# Patient Record
Sex: FEMALE | Race: WHITE | NOT HISPANIC OR LATINO | URBAN - METROPOLITAN AREA
[De-identification: names, ages, dates, MRNs, and addresses within clinical notes are randomized per-mention and may not be internally consistent; named-entity substitution may affect disease eponyms.]

---

## 2021-05-25 ENCOUNTER — EVALUATION (OUTPATIENT)
Dept: PHYSICAL THERAPY | Facility: CLINIC | Age: 61
End: 2021-05-25
Payer: COMMERCIAL

## 2021-05-25 DIAGNOSIS — G89.29 CHRONIC RIGHT HIP PAIN: ICD-10-CM

## 2021-05-25 DIAGNOSIS — M76.31 IT BAND SYNDROME, RIGHT: Primary | ICD-10-CM

## 2021-05-25 DIAGNOSIS — M25.551 CHRONIC RIGHT HIP PAIN: ICD-10-CM

## 2021-05-25 PROCEDURE — 97161 PT EVAL LOW COMPLEX 20 MIN: CPT

## 2021-05-25 NOTE — LETTER
May 26, 2021    MD [address]  [address]  [address] [place] [address] Dr    Patient: Felicity Small   YOB: 1960   Date of Visit: 2021     Encounter Diagnosis     ICD-10-CM    1  It band syndrome, right  M76 31    2  Chronic right hip pain  M25 551     G89 29        Dear Dr Shiloh Ragsdale: Thank you for your recent referral of Felicity Small  Please review the attached evaluation summary from Annel's recent visit  Please verify that you agree with the plan of care by signing the attached order  If you have any questions or concerns, please do not hesitate to call  I sincerely appreciate the opportunity to share in the care of one of your patients and hope to have another opportunity to work with you in the near future  Sincerely,    Niki Villagran, PT      Referring Provider:      I certify that I have read the below Plan of Care and certify the need for these services furnished under this plan of treatment while under my care  MD [address]  [address]  [address] [place] [address] Dr  Via Fax: [phone]          PT Evaluation     Today's date: 2021  Patient name: Felicity Small  : 1960  MRN: [id number]  Referring provider: Suze Rodriguez MD  Dx:   Encounter Diagnosis     ICD-10-CM    1  It band syndrome, right  M76 31    2  Chronic right hip pain  M25 551     G89 29                   Assessment  Assessment details: Pt is a 64 y o  female presenting to PT with chronic R hip and IT band pain  PT findings include: recreation of pain to palpation of glute max/TFL/IT band  In addition, pt with significant strength deficits in R hip flexion, abduction and ER  Pt with significant trendelenburg as well  Running patterns and signs and symptoms consistent with IT band syndrome   Pt will benefit from skilled PT to address above impairments to return to PLOF with less restriction and to reach functional goals  Impairments: abnormal or restricted ROM, impaired physical strength, lacks appropriate home exercise program and pain with function  Functional limitations: pain with transfers, pain with running, pain with pivoting  Symptom irritability: moderateUnderstanding of Dx/Px/POC: good   Prognosis: good    Goals  1  Pt will demonstrate understanding of HEP and POC in order to improve compliance with course of rehab in 2 weeks  2  Pt pain at worst will decrease by at least 4 points in order for pt to be able to perform sit to stand transfers with less restriction in 3 weeks  3  Pt hip abduction strength will improve to by at least 1 grade so that pt to return to participating in kickboxing by d/c    4  Pt will demonstrate negative trendelenburg gait and improved neuromuscular control without hip drop in functional movements to decrease hip loads by d/c  Plan  Planned therapy interventions: manual therapy, patient education, massage, strengthening, stretching, therapeutic activities, therapeutic exercise, home exercise program and abdominal trunk stabilization  Frequency: 2x week  Duration in weeks: 8  Treatment plan discussed with: patient        Subjective Evaluation    History of Present Illness  Mechanism of injury: Pt states that her R hip and IT band pain has been going on and off for the past 3 years  Pt reports that she used to run about 30 miles per week for about 10 years  She has stopped running secondary to pain  She states that she has started kickboxing in March which seemed to aggravate her pain  However as pt states really enjoying kickboxing, she has been attempting to continue kickboxing  Pt reports that when she had been running, she ran in a consistent loop with R LE toward gutter  Pt denies any cross training or LE strength training  Aggravating Factors: sitting to standing transfer, pivoting on the R LE       Pt denies any significant medical history regarding B hips but notes patellar pain that pt had gotten treated in the past    Quality of life: good    Pain  Current pain ratin  At best pain ratin  At worst pain ratin  Location: R hip  Quality: radiating, dull ache, pressure and tight  Relieving factors: relaxation and rest    Patient Goals  Patient goals for therapy: decreased pain, return to sport/leisure activities, independence with ADLs/IADLs and increased strength          Objective  Access Code: [email or web address]  URL: [email or web address]  Date: 2021  Prepared by: Nuno Carpenter    Hip ROM (R/L):  Hip Flex: WNL(pain at end range @ glute)/WNL  Hip Ext: WNL (stretch, pain at end range)/WNL  Hip IR: TBA  Hip ER: TBA    Palpation: +TTP IT band, Glute max, glute med, TFL (recreation of pain)    Strength (R;L)  Hip Flex: 3+/5; 4+/5  Hip ABD: 3+/5; 4+/5  Hip Ext: 4/5; 5/  Hip ER: 3+/5; 4+/5    Special Tests:  oTdd Dimas: +/Abhilash Chime: -/-  Trendelenburg stance: + R     Precautions: None      Manuals             IT band tigertail Gentle            Glute stretch Gentle                                      Neuro Re-Ed                                                                                                        Ther Ex             Bridge HEP demo            Clamshell HEP demo (grn)            Hip abduction HEP demo            Glute Foam roll HEP demo            Hip flexor stretch  supine HEP demo            Fire hydrant standing            IT band/HS stretch                          Ther Activity             Bike             Lateral band walk             Gait Training                                       Modalities             CP Post tx

## 2021-05-25 NOTE — PROGRESS NOTES
PT Evaluation     Today's date: 2021  Patient name: Frederic Wang  : 1960  MRN: [id number]  Referring provider: Rome Farah MD  Dx:   Encounter Diagnosis     ICD-10-CM    1  It band syndrome, right  M76 31    2  Chronic right hip pain  M25 551     G89 29                   Assessment  Assessment details: Pt is a 64 y o  female presenting to PT with chronic R hip and IT band pain  PT findings include: recreation of pain to palpation of glute max/TFL/IT band  In addition, pt with significant strength deficits in R hip flexion, abduction and ER  Pt with significant trendelenburg as well  Running patterns and signs and symptoms consistent with IT band syndrome  Pt will benefit from skilled PT to address above impairments to return to PLOF with less restriction and to reach functional goals  Impairments: abnormal or restricted ROM, impaired physical strength, lacks appropriate home exercise program and pain with function  Functional limitations: pain with transfers, pain with running, pain with pivoting  Symptom irritability: moderateUnderstanding of Dx/Px/POC: good   Prognosis: good    Goals  1  Pt will demonstrate understanding of HEP and POC in order to improve compliance with course of rehab in 2 weeks  2  Pt pain at worst will decrease by at least 4 points in order for pt to be able to perform sit to stand transfers with less restriction in 3 weeks  3  Pt hip abduction strength will improve to by at least 1 grade so that pt to return to participating in kickboxing by d/c    4  Pt will demonstrate negative trendelenburg gait and improved neuromuscular control without hip drop in functional movements to decrease hip loads by d/c       Plan  Planned therapy interventions: manual therapy, patient education, massage, strengthening, stretching, therapeutic activities, therapeutic exercise, home exercise program and abdominal trunk stabilization  Frequency: 2x week  Duration in weeks: 8  Treatment plan discussed with: patient        Subjective Evaluation    History of Present Illness  Mechanism of injury: Pt states that her R hip and IT band pain has been going on and off for the past 3 years  Pt reports that she used to run about 30 miles per week for about 10 years  She has stopped running secondary to pain  She states that she has started kickboxing in March which seemed to aggravate her pain  However as pt states really enjoying kickboxing, she has been attempting to continue kickboxing  Pt reports that when she had been running, she ran in a consistent loop with R LE toward gutter  Pt denies any cross training or LE strength training  Aggravating Factors: sitting to standing transfer, pivoting on the R LE  Pt denies any significant medical history regarding B hips but notes patellar pain that pt had gotten treated in the past    Quality of life: good    Pain  Current pain ratin  At best pain ratin  At worst pain ratin  Location: R hip  Quality: radiating, dull ache, pressure and tight  Relieving factors: relaxation and rest    Patient Goals  Patient goals for therapy: decreased pain, return to sport/leisure activities, independence with ADLs/IADLs and increased strength          Objective  Access Code: [email or web address]  URL: [email or web address]  Date: 2021  Prepared by: Neela Rinaldi    Hip ROM (R/L):  Hip Flex: WNL(pain at end range @ glute)/WNL  Hip Ext: WNL (stretch, pain at end range)/WNL  Hip IR: TBA  Hip ER: TBA    Palpation: +TTP IT band, Glute max, glute med, TFL (recreation of pain)    Strength (R;L)  Hip Flex: 3+/5; 4+/5  Hip ABD: 3+/5; 4+/5  Hip Ext: 4/5; 5/5  Hip ER: 3+/5; 4+/5    Special Tests:  Km: +/Alexmon Sneddon: -/-  Trendelenburg stance: + R     Precautions: None      Manuals             IT band tigertail Gentle            Glute stretch Gentle                                      Neuro Re-Ed Ther Ex             Bridge HEP demo            Clamshell HEP demo (grn)            Hip abduction HEP demo            Glute Foam roll HEP demo            Hip flexor stretch  supine HEP demo            Fire hydrant standing            IT band/HS stretch                          Ther Activity             Bike             Lateral band walk             Gait Training                                       Modalities             CP Post tx

## 2021-05-26 ENCOUNTER — TRANSCRIBE ORDERS (OUTPATIENT)
Dept: PHYSICAL THERAPY | Facility: CLINIC | Age: 61
End: 2021-05-26

## 2021-05-26 DIAGNOSIS — M25.551 CHRONIC RIGHT HIP PAIN: ICD-10-CM

## 2021-05-26 DIAGNOSIS — M76.31 IT BAND SYNDROME, RIGHT: Primary | ICD-10-CM

## 2021-05-26 DIAGNOSIS — G89.29 CHRONIC RIGHT HIP PAIN: ICD-10-CM

## 2021-05-27 ENCOUNTER — OFFICE VISIT (OUTPATIENT)
Dept: PHYSICAL THERAPY | Facility: CLINIC | Age: 61
End: 2021-05-27
Payer: COMMERCIAL

## 2021-05-27 DIAGNOSIS — M76.31 IT BAND SYNDROME, RIGHT: Primary | ICD-10-CM

## 2021-05-27 DIAGNOSIS — G89.29 CHRONIC RIGHT HIP PAIN: ICD-10-CM

## 2021-05-27 DIAGNOSIS — M25.551 CHRONIC RIGHT HIP PAIN: ICD-10-CM

## 2021-05-27 PROCEDURE — 97110 THERAPEUTIC EXERCISES: CPT

## 2021-05-27 PROCEDURE — 97530 THERAPEUTIC ACTIVITIES: CPT

## 2021-05-27 PROCEDURE — 97140 MANUAL THERAPY 1/> REGIONS: CPT

## 2021-05-27 NOTE — PROGRESS NOTES
Daily Note     Today's date: 2021  Patient name: Carmen Willams  : 1960  MRN: [id number]  Referring provider: Stephy Byrd MD  Dx:   Encounter Diagnosis     ICD-10-CM    1  It band syndrome, right  M76 31    2  Chronic right hip pain  M25 551     G89 29                   Subjective: Pt reports some pain while performing exercises, but nothing that lasts after completion of exercises  Pt reports today, feeling less pain as pt is resting the legs  She reports couple occasions of sharp pain but again doing okay  Objective: See treatment diary below      Assessment: As pt continues with significant fatigue performing glute exercises at home, only bridge progressed to using Pball  Manual therapy initiated gently to promote increase mm  Length  Pt will benefit from continued skilled PT to maximize function  Plan: Continue per plan of care  Precautions: None      Manuals            IT band tigertail Gentle DL           Glute stretch Gentle DL                                     Neuro Re-Ed                                                                                                        Ther Ex             Bridge HEP demo Pball 3x10            Clamshell HEP demo (grn) 3x10                         Hip abduction HEP demo 3x10           Glute Foam roll HEP demo            Hip flexor stretch  supine HEP demo            Fire hydrant standing            IT band/HS stretch                          Ther Activity             Bike  8'           Lateral band walk             Gait Training                                       Modalities             CP Post tx

## 2021-06-01 ENCOUNTER — OFFICE VISIT (OUTPATIENT)
Dept: PHYSICAL THERAPY | Facility: CLINIC | Age: 61
End: 2021-06-01
Payer: COMMERCIAL

## 2021-06-01 DIAGNOSIS — G89.29 CHRONIC RIGHT HIP PAIN: ICD-10-CM

## 2021-06-01 DIAGNOSIS — M76.31 IT BAND SYNDROME, RIGHT: Primary | ICD-10-CM

## 2021-06-01 DIAGNOSIS — M25.551 CHRONIC RIGHT HIP PAIN: ICD-10-CM

## 2021-06-01 PROCEDURE — 97110 THERAPEUTIC EXERCISES: CPT

## 2021-06-01 PROCEDURE — 97530 THERAPEUTIC ACTIVITIES: CPT

## 2021-06-01 PROCEDURE — 97140 MANUAL THERAPY 1/> REGIONS: CPT

## 2021-06-01 NOTE — PROGRESS NOTES
Daily Note     Today's date: 2021  Patient name: Alexis Monsalve  : 1960  MRN: [id number]  Referring provider: Analia Alan MD  Dx:   Encounter Diagnosis     ICD-10-CM    1  It band syndrome, right  M76 31    2  Chronic right hip pain  M25 551     G89 29        Start Time: 1530  Stop Time: 1610  Total time in clinic (min): 40 minutes    Subjective: Patient notes feeling better since stopping kick boxing 3 times a week  Still discomfort with SLR flex/abd and hip flexor stretch  Dull ache following last visit secondary to DOMS  Objective: See treatment diary below    Assessment: Patient occasionally has a shooting pain originating at the posterior aspect of her R hip into the HS just proximal of the knee  Potentially sciatic symptoms  No adverse effect to TE listed below  Progress as able  Plan: Continue per plan of care  Precautions: None    Manuals           IT band tigertail Gentle DL JM          Glute stretch Gentle DL ANGELITO                                    Neuro Re-Ed                                                                                                        Ther Ex             Bridge HEP demo Pball 3x10  Pball  3x10          Clamshell HEP demo (grn) 3x10  GTB  2x10          SLR   2x10          Hip abduction HEP demo 3x10 3x10          Piriformis Stretch   20"x3          Glute Foam roll HEP demo            Hip flexor stretch  supine HEP demo            Fire hydrant standing            IT band/HS stretch   20"x3                       Ther Activity             Bike  8' 8'          Lateral band walk             Gait Training                                       Modalities             CP Post tx

## 2021-06-03 ENCOUNTER — OFFICE VISIT (OUTPATIENT)
Dept: PHYSICAL THERAPY | Facility: CLINIC | Age: 61
End: 2021-06-03
Payer: COMMERCIAL

## 2021-06-03 DIAGNOSIS — M76.31 IT BAND SYNDROME, RIGHT: Primary | ICD-10-CM

## 2021-06-03 DIAGNOSIS — M25.551 CHRONIC RIGHT HIP PAIN: ICD-10-CM

## 2021-06-03 DIAGNOSIS — G89.29 CHRONIC RIGHT HIP PAIN: ICD-10-CM

## 2021-06-03 PROCEDURE — 97110 THERAPEUTIC EXERCISES: CPT

## 2021-06-03 PROCEDURE — 97140 MANUAL THERAPY 1/> REGIONS: CPT

## 2021-06-03 PROCEDURE — 97530 THERAPEUTIC ACTIVITIES: CPT

## 2021-06-03 NOTE — PROGRESS NOTES
Daily Note     Today's date: 6/3/2021  Patient name: Lucía Ramirez  : 1960  MRN: [id number]  Referring provider: Richard Jackson MD  Dx:   Encounter Diagnosis     ICD-10-CM    1  It band syndrome, right  M76 31    2  Chronic right hip pain  M25 551     G89 29        Start Time: 1554  Stop Time: 1624  Total time in clinic (min): 30 minutes      Subjective: Patient reports Tuesday's PT treatment "might have set me back a little bit" as she's been experiencing an increase in right hip pain  Patient reports Advil has offered minimal pain relief  Patient reports she is scheduled to fly to [place] on Tuesday  Objective: See treatment diary below  Assessment: Right lower extremity fatigue noted throughout performance of therapeutic exercise program    Patient may benefit from performing hip external rotation stretch, single knee to chest stretch, and hip abduction exercise while seated on her upcoming flight to [place] and she is provided with written HEP instructions to do this  Plan: Continue treatment as per PT plan of care         Precautions: None    Manuals 5/25 5/27 6/1 6/3         IT band tigertail Gentle DL JM JLW  5'         Glute stretch Gentle DL JM JLW  30"x3         Hip flexor stretch    JLW  30"x3                      Neuro Re-Ed                                                                                                        Ther Ex             Bridge HEP demo Pball 3x10  Pball  3x10          Clamshell HEP demo (grn) 3x10  GTB  2x10 green  2x10         SLR   2x10 2x10         Prone glute lift             Hip abduction HEP demo 3x10 3x10 2x10         Piriformis Stretch   20"x3          Glute Foam roll HEP demo            Hip flexor stretch  supine HEP demo            Fire hydrant standing            IT band/HS stretch   20"x3 30"x3 ea                      Ther Activity             Bike  8' 8' 8'         Lateral band walk             Gait Training Modalities             CP Post tx

## 2021-06-08 ENCOUNTER — APPOINTMENT (OUTPATIENT)
Dept: PHYSICAL THERAPY | Facility: CLINIC | Age: 61
End: 2021-06-08
Payer: COMMERCIAL

## 2021-06-10 ENCOUNTER — APPOINTMENT (OUTPATIENT)
Dept: PHYSICAL THERAPY | Facility: CLINIC | Age: 61
End: 2021-06-10
Payer: COMMERCIAL

## 2021-06-17 ENCOUNTER — OFFICE VISIT (OUTPATIENT)
Dept: PHYSICAL THERAPY | Facility: CLINIC | Age: 61
End: 2021-06-17
Payer: COMMERCIAL

## 2021-06-17 DIAGNOSIS — M25.551 CHRONIC RIGHT HIP PAIN: ICD-10-CM

## 2021-06-17 DIAGNOSIS — G89.29 CHRONIC RIGHT HIP PAIN: ICD-10-CM

## 2021-06-17 DIAGNOSIS — M76.31 IT BAND SYNDROME, RIGHT: Primary | ICD-10-CM

## 2021-06-17 PROCEDURE — 97140 MANUAL THERAPY 1/> REGIONS: CPT

## 2021-06-17 PROCEDURE — 97110 THERAPEUTIC EXERCISES: CPT

## 2021-06-17 PROCEDURE — 97530 THERAPEUTIC ACTIVITIES: CPT

## 2021-06-17 NOTE — PROGRESS NOTES
Daily Note     Today's date: 2021  Patient name: Alireza Rdz  : 1960  MRN: [id number]  Referring provider: Joslyn Kim MD  Dx:   Encounter Diagnosis     ICD-10-CM    1  It band syndrome, right  M76 31    2  Chronic right hip pain  M25 551     G89 29                   Subjective: Pt reports that her R hip has been better, feeling 1/10 pain today  Pt has been holding from kickboxing and she states she is not sure if she will return due to it contributing to pain  Objective: See treatment diary below      Assessment: Initiated fire hydrant in standing with good tolerance  Updated HEP to include fire hydrants as well  Pt with improvement in symptoms to IT band, localized pain only at the gluteal mm  At this point  Plan: Continue per plan of care  Precautions: None    Manuals 5/25 5/27 6/1 6/3 6/17        IT band tigertail Gentle DL JM JLW  5' DL        Glute stretch Gentle DL JM JLW  30"x3 DL 30"x3        Hip flexor stretch    JLW  30"x3 DL 30"x3                     Neuro Re-Ed                                                                                                        Ther Ex             Bridge HEP demo Pball 3x10  Pball  3x10  Pball 2x10        Clamshell HEP demo (grn) 3x10  GTB  2x10 green  2x10 Grn 2x10        SLR   2x10 2x10 2x10        Prone glute lift             Hip abduction HEP demo 3x10 3x10 2x10 2x10        Piriformis Stretch   20"x3          Glute Foam roll HEP demo            Hip flexor stretch  supine HEP demo            Fire hydrant standing    stand Yellow 2x10 (updated HEP)        IT band/HS stretch   20"x3 30"x3 ea NV         Seated hip IR/ER     10x ea  Ther Activity             Bike  8' 8' 8' 8'        Lateral band walk             Gait Training                                       Modalities             CP Post tx

## 2021-06-29 ENCOUNTER — APPOINTMENT (OUTPATIENT)
Dept: PHYSICAL THERAPY | Facility: CLINIC | Age: 61
End: 2021-06-29
Payer: COMMERCIAL

## 2021-07-12 NOTE — PROGRESS NOTES
Pt is approaching 30 days since last PT session  Updated measures not taken but at last visit, pt with progress  Pt to be discharged from skilled PT

## 2022-01-13 ENCOUNTER — APPOINTMENT (OUTPATIENT)
Dept: RADIOLOGY | Facility: CLINIC | Age: 62
End: 2022-01-13
Payer: COMMERCIAL

## 2022-01-13 DIAGNOSIS — M54.2 CERVICALGIA: ICD-10-CM

## 2022-01-13 DIAGNOSIS — M54.51 VERTEBROGENIC LOW BACK PAIN: ICD-10-CM

## 2022-01-13 PROCEDURE — 72100 X-RAY EXAM L-S SPINE 2/3 VWS: CPT

## 2022-01-13 PROCEDURE — 72052 X-RAY EXAM NECK SPINE 6/>VWS: CPT

## 2022-06-02 ENCOUNTER — OFFICE VISIT (OUTPATIENT)
Dept: URGENT CARE | Facility: CLINIC | Age: 62
End: 2022-06-02
Payer: COMMERCIAL

## 2022-06-02 VITALS
HEIGHT: 66 IN | DIASTOLIC BLOOD PRESSURE: 84 MMHG | OXYGEN SATURATION: 99 % | WEIGHT: 130 LBS | TEMPERATURE: 97.5 F | BODY MASS INDEX: 20.89 KG/M2 | RESPIRATION RATE: 20 BRPM | HEART RATE: 71 BPM | SYSTOLIC BLOOD PRESSURE: 186 MMHG

## 2022-06-02 DIAGNOSIS — R42 LIGHTHEADEDNESS: ICD-10-CM

## 2022-06-02 DIAGNOSIS — R51.9 NONINTRACTABLE HEADACHE, UNSPECIFIED CHRONICITY PATTERN, UNSPECIFIED HEADACHE TYPE: ICD-10-CM

## 2022-06-02 DIAGNOSIS — I10 ESSENTIAL HYPERTENSION: ICD-10-CM

## 2022-06-02 DIAGNOSIS — R42 DIZZINESS: Primary | ICD-10-CM

## 2022-06-02 PROCEDURE — 99203 OFFICE O/P NEW LOW 30 MIN: CPT | Performed by: PHYSICIAN ASSISTANT

## 2022-06-02 PROCEDURE — 82948 REAGENT STRIP/BLOOD GLUCOSE: CPT

## 2022-06-02 RX ORDER — VORTIOXETINE 5 MG/1
TABLET, FILM COATED ORAL
COMMUNITY
Start: 2022-03-19

## 2022-06-02 RX ORDER — ACETAMINOPHEN 160 MG
2000 TABLET,DISINTEGRATING ORAL DAILY
COMMUNITY
Start: 2022-03-23

## 2022-06-02 RX ORDER — LANOLIN ALCOHOL/MO/W.PET/CERES
100 CREAM (GRAM) TOPICAL DAILY
COMMUNITY

## 2022-06-02 RX ORDER — CONJUGATED ESTROGENS 0.62 MG/1
0.62 TABLET, FILM COATED ORAL DAILY
COMMUNITY
Start: 2022-03-09

## 2022-06-02 NOTE — PROGRESS NOTES
[facility]        NAME: Hyun Daniel is a 63 y o  female  : 1960    MRN: [id number]  DATE: 2022  TIME: 11:14 AM    BP (!) 186/84 Comment: left arm dynamp  Pulse 71   Temp 97 5 °F (36 4 °C) (Tympanic)   Resp 20   Ht 5' 6" (1 676 m)   Wt 59 kg (130 lb)   SpO2 99%   BMI 20 98 kg/m²     Assessment and Plan   Dizziness [R42]  1  Dizziness     2  Lightheadedness     3  Nonintractable headache, unspecified chronicity pattern, unspecified headache type           Patient Instructions       Follow up with PCP in 3-5 days  Proceed to  ER if symptoms worsen  Chief Complaint     Chief Complaint   Patient presents with    Dizziness     Started at 0900am, with headache, and dizziness, has hx of PVC's, no chest pain  No fevers or cold symptoms         History of Present Illness       Pt with dizziness and lightheadedness and slight heat pressur starting at around 9am, about 2 hours ago, not worse with head position change,        Review of Systems   Review of Systems   Constitutional: Negative  HENT: Negative  Eyes: Negative  Respiratory: Negative  Cardiovascular: Negative  Gastrointestinal: Negative  Endocrine: Negative  Genitourinary: Negative  Musculoskeletal: Negative  Skin: Negative  Allergic/Immunologic: Negative  Neurological: Positive for dizziness, light-headedness and headaches  Hematological: Negative  Psychiatric/Behavioral: Negative  All other systems reviewed and are negative          Current Medications       Current Outpatient Medications:     Cholecalciferol (Vitamin D3) 50 MCG (2000 UT) capsule, Take 2,000 Units by mouth daily, Disp: , Rfl:     Premarin 0 625 MG tablet, Take 0 625 mg by mouth daily, Disp: , Rfl:     thiamine 100 MG tablet, Take 100 mg by mouth daily, Disp: , Rfl:     Trintellix 5 MG tablet, TAKE 1 TABLET BY ORAL ROUTE EVERY DAY AT THE SAME TIME EACH DAY, Disp: , Rfl:     Current Allergies     Allergies as of 06/02/2022 - Reviewed 06/02/2022   Allergen Reaction Noted    Codeine Other (See Comments) 11/10/2014            The following portions of the patient's history were reviewed and updated as appropriate: allergies, current medications, past family history, past medical history, past social history, past surgical history and problem list      No past medical history on file  No past surgical history on file  No family history on file  Medications have been verified  Objective   BP (!) 186/84 Comment: left arm dynamp  Pulse 71   Temp 97 5 °F (36 4 °C) (Tympanic)   Resp 20   Ht 5' 6" (1 676 m)   Wt 59 kg (130 lb)   SpO2 99%   BMI 20 98 kg/m²        Physical Exam     Physical Exam  Vitals and nursing note reviewed  Constitutional:       Appearance: Normal appearance  Comments: Discussed with pt further evaluation than what can be given to pt here in office, pt declines ambulance will have family take to er  For evaluation    HENT:      Head: Normocephalic and atraumatic  Right Ear: Tympanic membrane, ear canal and external ear normal       Left Ear: Tympanic membrane, ear canal and external ear normal       Nose: Nose normal       Mouth/Throat:      Mouth: Mucous membranes are moist       Pharynx: Oropharynx is clear  Eyes:      Extraocular Movements: Extraocular movements intact  Conjunctiva/sclera: Conjunctivae normal       Pupils: Pupils are equal, round, and reactive to light  Cardiovascular:      Rate and Rhythm: Normal rate and regular rhythm  Pulses: Normal pulses  Heart sounds: Normal heart sounds  Pulmonary:      Effort: Pulmonary effort is normal       Breath sounds: Normal breath sounds  Abdominal:      General: Abdomen is flat  Bowel sounds are normal       Palpations: Abdomen is soft  Musculoskeletal:         General: Normal range of motion  Cervical back: Normal range of motion and neck supple  Skin:     General: Skin is warm  Capillary Refill: Capillary refill takes less than 2 seconds  Neurological:      General: No focal deficit present  Mental Status: She is alert and oriented to person, place, and time        Comments: Ambulation wnl  Cn 2-12 wnl   dtr wnl  Limb strength wnl no weakness or drift    Psychiatric:         Mood and Affect: Mood normal          Behavior: Behavior normal

## 2022-06-03 LAB — GLUCOSE SERPL-MCNC: 110 MG/DL (ref 65–140)

## 2022-11-08 LAB
ATRIAL RATE: 64 BPM
P AXIS: -15 DEGREES
PR INTERVAL: 188 MS
QRS AXIS: -22 DEGREES
QRSD INTERVAL: 82 MS
QT INTERVAL: 416 MS
QTC INTERVAL: 429 MS
T WAVE AXIS: -13 DEGREES
VENTRICULAR RATE: 64 BPM